# Patient Record
(demographics unavailable — no encounter records)

---

## 2025-02-21 NOTE — REASON FOR VISIT
[Follow-Up] : a follow-up visit for [Patient] : patient [Parents] : parents [Medical Records] : medical records [[email or web address]] : IgA